# Patient Record
Sex: MALE | Race: WHITE
[De-identification: names, ages, dates, MRNs, and addresses within clinical notes are randomized per-mention and may not be internally consistent; named-entity substitution may affect disease eponyms.]

---

## 2019-04-26 ENCOUNTER — HOSPITAL ENCOUNTER (EMERGENCY)
Dept: HOSPITAL 97 - ER | Age: 59
Discharge: HOME | End: 2019-04-26
Payer: COMMERCIAL

## 2019-04-26 DIAGNOSIS — L03.314: Primary | ICD-10-CM

## 2019-04-26 LAB
BUN BLD-MCNC: 15 MG/DL (ref 7–18)
GLUCOSE SERPLBLD-MCNC: 136 MG/DL (ref 74–106)
HCT VFR BLD CALC: 40.6 % (ref 39.6–49)
INR BLD: 0.98
LYMPHOCYTES # SPEC AUTO: 2.5 K/UL (ref 0.7–4.9)
PMV BLD: 7.8 FL (ref 7.6–11.3)
POTASSIUM SERPL-SCNC: 3.9 MMOL/L (ref 3.5–5.1)
RBC # BLD: 4.55 M/UL (ref 4.33–5.43)

## 2019-04-26 PROCEDURE — 76870 US EXAM SCROTUM: CPT

## 2019-04-26 PROCEDURE — 96360 HYDRATION IV INFUSION INIT: CPT

## 2019-04-26 PROCEDURE — 99284 EMERGENCY DEPT VISIT MOD MDM: CPT

## 2019-04-26 PROCEDURE — 36415 COLL VENOUS BLD VENIPUNCTURE: CPT

## 2019-04-26 PROCEDURE — 85025 COMPLETE CBC W/AUTO DIFF WBC: CPT

## 2019-04-26 PROCEDURE — 80048 BASIC METABOLIC PNL TOTAL CA: CPT

## 2019-04-26 PROCEDURE — 85610 PROTHROMBIN TIME: CPT

## 2019-04-26 NOTE — RAD REPORT
EXAM DESCRIPTION:  US - Scrotum Testicles - 4/26/2019 8:36 pm

 

CLINICAL HISTORY:  Testicular pain

 

COMPARISON:  None

 

FINDINGS:  Right testicle measures 5 x 2.5 x 3 centimeters. Echotexture is homogeneous. Normal blood 
flow

 

Left testicle measures 5 x 2.6 x 3.4 centimeters. Echotexture is homogeneous. Normal blood flow

 

The epididymides are normal in size and echotexture. Normal blood flow is seen.

6 millimeter left spermatocele

 

Patient has a palpable area within the left inguinal region. Ultrasound demonstrates no gross abnorma
lity

 

IMPRESSION:  A 6 millimeter left spermatocele

## 2019-04-26 NOTE — ER
Nurse's Notes                                                                                     

 [facility]                                                                 

Name: Sandeep Valdez                                                                               

Age: 58 yrs                                                                                       

Sex: Male                                                                                         

: 1960                                                                                   

MRN: [id number]                                                                                   

Arrival Date: 2019                                                                          

Time: 18:17                                                                                       

Account#: [id number]                                                                            

Bed 15                                                                                            

Private MD: BRYAN Tena C                                                                            

Diagnosis: Cellulitis of groin-left inguinal                                                      

                                                                                                  

Presentation:                                                                                     

                                                                                             

18:36 Presenting complaint: Patient states: abscess to left groin area since Tuesday, was     iw  

      seen at [facility] urgent care, put on Clindamycin and hasn't gotten better. Transition of      

      care: patient was not received from another setting of care. Onset of symptoms was          

      2019. Risk Assessment: Do you want to hurt yourself or someone else? Patient      

      reports no desire to harm self or others. Initial Sepsis Screen: Does the patient meet      

      any 2 criteria? No. Patient's initial sepsis screen is negative. Does the patient have      

      a suspected source of infection? No. Patient's initial sepsis screen is negative. Care      

      prior to arrival: None.                                                                     

18:36 Method Of Arrival: Ambulatory                                                           iw  

18:36 Acuity: WILFRIDO 3                                                                           iw  

                                                                                                  

Historical:                                                                                       

- Allergies:                                                                                      

18:38 No Known Allergies;                                                                     iw  

- PMHx:                                                                                           

18:38 Diabetes - IDDM;                                                                        iw  

- PSHx:                                                                                           

18:38 left hand sx;                                                                           iw  

                                                                                                  

- Immunization history:: Adult Immunizations up to date.                                          

- Social history:: Smoking status: Patient/guardian denies using tobacco.                         

- Ebola Screening: : Patient negative for fever greater than or equal to 101.5 degrees            

  Fahrenheit, and additional compatible Ebola Virus Disease symptoms Patient denies               

  exposure to infectious person Patient denies travel to an Ebola-affected area in the            

  21 days before illness onset No symptoms or risks identified at this time.                      

                                                                                                  

                                                                                                  

Screenin:18 Abuse screen: Denies threats or abuse. Denies injuries from another. Nutritional        aj  

      screening: No deficits noted. Tuberculosis screening: No symptoms or risk factors           

      identified. Fall Risk None identified.                                                      

                                                                                                  

Assessment:                                                                                       

19:16 General: Appears in no apparent distress. comfortable, Behavior is calm, cooperative,   aj  

      appropriate for age. Pain: Complains of pain in groin and left inner thigh. Neuro:          

      Level of Consciousness is awake, alert, obeys commands. Respiratory: Airway is patent       

      Respiratory effort is even, unlabored, Respiratory pattern is regular, symmetrical.         

      Derm: Skin is intact, is healthy with good turgor, Skin is pink, warm \T\ dry. normal,      

      Abscess located on groin, left femoral area and left inner thigh is dime sized, is red.     

21:33 Reassessment: Patient appears in no apparent distress at this time. No changes from     aj  

      previously documented assessment. Patient and/or family updated on plan of care and         

      expected duration. Pain level reassessed. Patient is alert, oriented x 3, equal             

      unlabored respirations, skin warm/dry/pink. Patient states feeling better.                  

                                                                                                  

Vital Signs:                                                                                      

18:38  / 72; Pulse 84; Resp 16; Temp 99.0(O); Pulse Ox 96% on R/A; Weight 61.69 kg;     iw  

      Height 6 ft. 2 in. (187.96 cm); Pain 2/10;                                                  

18:38 Body Mass Index 17.46 (61.69 kg, 187.96 cm)                                             iw  

                                                                                                  

ED Course:                                                                                        

18:17 Patient arrived in ED.                                                                  mr  

18:17 BRYAN Tena MD is Private Physician.                                                       mr  

18:38 Triage completed.                                                                       iw  

18:38 Arm band placed on.                                                                     iw  

18:57 Daisha Valderrama, RN is Primary Nurse.                                                     aj  

19:17 Mamadou Putnam PA is PHCP.                                                                cp  

19:17 Silvia Olvera MD is Attending Physician.                                           cp  

19:52 Mamadou De La Vega MD is Attending Physician.                                             cp  

20:22 Patient has correct armband on for positive identification. Bed in low position.        aj  

20:22 Inserted saline lock: 20 gauge in right antecubital area, using aseptic technique.      aj  

      Blood collected.                                                                            

20:33 US Scrotum Testicles In Process Unspecified.                                            EDMS

21:13 Jus Ramirez MD is Referral Physician.                                              cp  

21:33 No provider procedures requiring assistance completed. IV discontinued, intact,         aj  

      bleeding controlled, No redness/swelling at site. Pressure dressing applied.                

                                                                                                  

Administered Medications:                                                                         

20:23 Drug: NS 0.9% 1000 ml Route: IV; Rate: 1 bolus; Site: right antecubital;                aj  

20:56 Follow up: Response: No adverse reaction; IV Status: Completed infusion; IV Intake:     aj  

      1000ml                                                                                      

20:56 Drug: LevaQUIN 750 mg Route: PO;                                                        aj  

21:34 Follow up: Response: No adverse reaction                                                aj  

                                                                                                  

                                                                                                  

Intake:                                                                                           

20:56 IV: 1000ml; Total: 1000ml.                                                              aj  

                                                                                                  

Outcome:                                                                                          

21:13 Discharge ordered by MD.                                                                cp  

21:33 Discharged to home ambulatory.                                                          aj  

21:33 Condition: good                                                                             

21:33 Discharge instructions given to patient, Instructed on discharge instructions, follow       

      up and referral plans. no driving heavy equipment, Demonstrated understanding of            

      instructions, follow-up care, medications, Prescriptions given X 2.                         

21:34 Patient left the ED.                                                                    aj  

                                                                                                  

Signatures:                                                                                       

Dispatcher [employer]                           EDDaisha Godoy RN RN aj Rivera, Mary mr Williams, Irene, RN RN iw Page, Corey, PA                         PA   cp                                                   

                                                                                                  

**************************************************************************************************

## 2019-04-26 NOTE — EDPHYS
Physician Documentation                                                                           

 [facility]                                                                 

Name: Sandeep Valdez                                                                               

Age: 58 yrs                                                                                       

Sex: Male                                                                                         

: 1960                                                                                   

MRN: [id number]                                                                                   

Arrival Date: 2019                                                                          

Time: 18:17                                                                                       

Account#: [id number]                                                                            

Bed 15                                                                                            

Private MD: BRYAN Tena C                                                                            

ED Physician Mamadou De La Vega                                                                      

HPI:                                                                                              

                                                                                             

19:59 This 58 yrs old  Male presents to ER via Ambulatory with complaints of Abscess.cp  

19:59 the patient presents with a swollen area of the left testicle and left groin.           cp  

20:00 Onset: The symptoms/episode began/occurred 3 day(s) ago.                                cp  

20:00 Associated signs and symptoms: Pertinent negatives: discharge, drainage, fever.         cp  

      Modifying factors: the symptoms are aggravated by movement, pressure. The patient has       

      been recently seen at an urgent care, this week, for similar complaints, prescribed         

      oral clindamycin.                                                                           

                                                                                                  

Historical:                                                                                       

- Allergies:                                                                                      

18:38 No Known Allergies;                                                                     iw  

- PMHx:                                                                                           

18:38 Diabetes - IDDM;                                                                        iw  

- PSHx:                                                                                           

18:38 left hand sx;                                                                           iw  

                                                                                                  

- Immunization history:: Adult Immunizations up to date.                                          

- Social history:: Smoking status: Patient/guardian denies using tobacco.                         

- Ebola Screening: : Patient negative for fever greater than or equal to 101.5 degrees            

  Fahrenheit, and additional compatible Ebola Virus Disease symptoms Patient denies               

  exposure to infectious person Patient denies travel to an Ebola-affected area in the            

  21 days before illness onset No symptoms or risks identified at this time.                      

                                                                                                  

                                                                                                  

ROS:                                                                                              

20:05 Constitutional: Negative for body aches, chills, fever, poor PO intake.                 cp  

20:05 Eyes: Negative for injury, pain, redness, and discharge.                                cp  

20:05 Cardiovascular: Negative for chest pain, palpitations.                                      

20:05 Respiratory: Negative for cough, shortness of breath, wheezing.                             

20:05 Abdomen/GI: Negative for abdominal pain, nausea, vomiting, and diarrhea.                    

20:05 Skin: Positive for erythema, swelling, of the left groin and left inguinal area.            

20:05 All other systems are negative.                                                             

                                                                                                  

Exam:                                                                                             

20:15 Head/Face:  Normocephalic, atraumatic.                                                  cp  

20:15 Constitutional: The patient appears in no acute distress, alert, awake, non-toxic, well     

      developed, well nourished.                                                                  

20:15 Eyes: Periorbital structures: appear normal, Conjunctiva: normal, no exudate, no        cp  

      injection, Sclera: no appreciated abnormality, Lids and lashes: appear normal,              

      bilaterally.                                                                                

20:15 ENT: External ear(s): are unremarkable, Nose: is normal, Mouth: Lips: moist, Oral           

      mucosa: moist, Posterior pharynx: Airway: no evidence of obstruction, patent.               

20:15 Chest/axilla: Inspection: normal.                                                           

20:15 Cardiovascular: Rate: normal.                                                               

20:15 Respiratory: the patient does not display signs of respiratory distress,  Respirations:     

      normal, no use of accessory muscles, no retractions, no splinting, no tachypnea.            

20:15 Abdomen/GI: Inspection: abdomen appears normal, Palpation: abdomen is soft and              

      non-tender, in all quadrants, rebound tenderness, is not appreciated, voluntary             

      guarding, is not appreciated, involuntary guarding, is not appreciated.                     

20:15 Skin: cellulitis, well demarcated, on the  left inguinal and left groin and left inner      

      thigh.                                                                                      

                                                                                                  

Vital Signs:                                                                                      

18:38  / 72; Pulse 84; Resp 16; Temp 99.0(O); Pulse Ox 96% on R/A; Weight 61.69 kg;     iw  

      Height 6 ft. 2 in. (187.96 cm); Pain 2/10;                                                  

18:38 Body Mass Index 17.46 (61.69 kg, 187.96 cm)                                             iw  

                                                                                                  

MDM:                                                                                              

19:17 Patient medically screened.                                                             cp  

20:00 Differential diagnosis: abscess, cellulitis, insect bite.                               cp  

21:10 Data reviewed: vital signs, nurses notes, lab test result(s), radiologic studies,       cp  

      ultrasound.                                                                                 

21:10 Counseling: I had a detailed discussion with the patient and/or guardian regarding: the cp  

      historical points, exam findings, and any diagnostic results supporting the                 

      discharge/admit diagnosis, lab results, radiology results, the need for outpatient          

      follow up, a urologist, to return to the emergency department if symptoms worsen or         

      persist or if there are any questions or concerns that arise at home. Response to           

      treatment: the patient's symptoms have mildly improved after treatment, and as a            

      result, I will discharge patient. ED course: VSS. Patient is to continue oral               

      clindamycin and will add oral Levaquin. Will discharge to home for continued monitoring.    

                                                                                                  

                                                                                             

19:53 Order name: CBC with Diff; Complete Time: 20:34                                         cp  

                                                                                             

20:34 Interpretation: Reviewed.                                                               cp  

                                                                                             

19:53 Order name: BMP; Complete Time: 20:34                                                   cp  

                                                                                             

20:51 Interpretation: Normal except: ; GLUC 136; CA 8.3.                                cp  

                                                                                             

19:53 Order name: US Scrotum Testicles; Complete Time: 20:46                                  cp  

                                                                                             

19:53 Order name: PT-INR; Complete Time: 20:34                                                cp  

                                                                                             

19:53 Order name: IV; Complete Time: 20:23                                                    cp  

                                                                                                  

Administered Medications:                                                                         

20:23 Drug: NS 0.9% 1000 ml Route: IV; Rate: 1 bolus; Site: right antecubital;                aj  

20:56 Follow up: Response: No adverse reaction; IV Status: Completed infusion; IV Intake:     aj  

      1000ml                                                                                      

20:56 Drug: LevaQUIN 750 mg Route: PO;                                                        aj  

21:34 Follow up: Response: No adverse reaction                                                  

                                                                                                  

                                                                                                  

Disposition:                                                                                      

19 21:13 Discharged to Home. Impression: Cellulitis of groin - left inguinal.               

- Condition is Stable.                                                                            

- Discharge Instructions: Cellulitis, Adult.                                                      

- Prescriptions for Levaquin 750 mg Oral Tablet - take 1 tablet by ORAL route once                

  daily for 8-10 days; 9 tablet. Ibuprofen 800 mg Oral Tablet - take 1 tablet by ORAL             

  route every 8 hours As needed take with food; 30 tablet.                                        

- Medication Reconciliation Form, Thank You Letter, Antibiotic Education, Prescription            

  Opioid Use form.                                                                                

- Follow up: Jus Ramirez MD; When: 2 - 3 days; Reason: Recheck today's complaints.           

- Problem is new.                                                                                 

- Symptoms have improved.                                                                         

                                                                                                  

                                                                                                  

                                                                                                  

Addendum:                                                                                         

2019                                                                                        

     10:57 Co-signature as Attending Physician, Mamadou De La Vega MD I agree with the assessment and  c
ha

           plan of care.                                                                          

                                                                                                  

Signatures:                                                                                       

Dispatcher [employer]                           EDDaisha Godoy RN RN aj Anderson, Corey, MD MD cha Williams, Irene, RN RN iw Page, Corey, PA PA   cp                                                   

                                                                                                  

Corrections: (The following items were deleted from the chart)                                    

                                                                                             

20:51 20:34 Normal except: ; GLUC 136. cp                                               cp  

20:58  20:00 This 58 yrs old  Male presents to ER via Ambulatory with           cp  

      complaints of Abscess. cp                                                                   

                                                                                             

20:58  20:00 the patient presents with a swollen area of the left lateral testicle and   cp  

      groin, cp                                                                                   

                                                                                             

20:58  20:00 Description: erythematous, swollen, warm, cp                                cp  

                                                                                             

21:24 21:13 2019 21:13 Discharged to Home. Impression: Cellulitis of groin - left.      cp  

      Condition is Stable. Forms are Medication Reconciliation Form, Thank You Letter,            

      Antibiotic Education, Prescription Opioid Use. Follow up: Jus Ramirez; When: 2 - 3       

      days; Reason: Recheck today's complaints. Problem is new. Symptoms have improved. cp        

21:34 21:24 2019 21:13 Discharged to Home. Impression: Cellulitis of groin - left       aj  

      inguinal. Condition is Stable. Discharge Instructions: Cellulitis, Adult. Prescriptions     

      for Levaquin 750 mg Oral Tablet - take 1 tablet by ORAL route once daily for 8-10 days;     

      9 tablet, Ibuprofen 800 mg Oral Tablet - take 1 tablet by ORAL route every 8 hours As       

      needed take with food; 30 tablet. and Forms are Medication Reconciliation Form, Thank       

      You Letter, Antibiotic Education, Prescription Opioid Use. Follow up: Jus Ramirez;       

      When: 2 - 3 days; Reason: Recheck today's complaints. Problem is new. Symptoms have         

      improved. cp                                                                                

                                                                                                  

**************************************************************************************************

## 2019-08-08 ENCOUNTER — HOSPITAL ENCOUNTER (EMERGENCY)
Dept: HOSPITAL 97 - ER | Age: 59
Discharge: HOME | End: 2019-08-08
Payer: COMMERCIAL

## 2019-08-08 DIAGNOSIS — S67.192A: Primary | ICD-10-CM

## 2019-08-08 DIAGNOSIS — E10.9: ICD-10-CM
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
## 2019-08-08 DIAGNOSIS — Z79.4: ICD-10-CM
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
## 2019-08-08 PROCEDURE — 99284 EMERGENCY DEPT VISIT MOD MDM: CPT

## 2019-08-08 NOTE — ER
Nurse's Notes                                                                                     

 [facility]                                                                 

Name: Sandeep Valdez                                                                               

Age: 58 yrs                                                                                       

Sex: Male                                                                                         

: 1960                                                                                   

MRN: [id number]                                                                                   

Arrival Date: 2019                                                                          

Time: 13:44                                                                                       

Account#: [id number]                                                                            

Bed 10                                                                                            

Private MD: BRYAN Tena C                                                                            

Diagnosis: Crush injury to tip of right index finger                                              

                                                                                                  

Presentation:                                                                                     

                                                                                             

13:47 Presenting complaint: Patient states: Was installing AC unit and dropped onto R middle  ph  

      finger, injury noted to nail. Transition of care: patient was not received from another     

      setting of care. Onset of symptoms was 2019. Risk Assessment: Do you want to     

      hurt yourself or someone else? Patient reports no desire to harm self or others.            

      Initial Sepsis Screen: Does the patient meet any 2 criteria? No. Patient's initial          

      sepsis screen is negative. Does the patient have a suspected source of infection? No.       

      Patient's initial sepsis screen is negative. Care prior to arrival: None.                   

13:47 Method Of Arrival: Ambulatory                                                           ph  

13:47 Acuity: WILFRIDO 4                                                                           ph  

                                                                                                  

Historical:                                                                                       

- Allergies:                                                                                      

13:49 No Known Allergies;                                                                     ph  

- Home Meds:                                                                                      

13:49 Altace 5 mg Oral cap [Active]; Humalog 100 unit/mL Sub-Q crtg for Type 1 Diabetes       ph  

      Mellitus [Active]; Tresiba FlexTouch U-100 100 unit/mL (3 mL) subcutaneous inpn             

      [Active]; pravastatin 40 mg Oral tab 1 tab once daily [Active];                             

- PMHx:                                                                                           

13:49 Diabetes - IDDM;                                                                        ph  

- PSHx:                                                                                           

13:49 left hand sx;                                                                           ph  

                                                                                                  

- Immunization history:: Adult Immunizations unknown.                                             

- Social history:: Smoking status: Patient/guardian denies using tobacco.                         

- Ebola Screening: : No symptoms or risks identified at this time.                                

                                                                                                  

                                                                                                  

Screenin:00 Abuse screen: Denies threats or abuse. Denies injuries from another. Nutritional        ph  

      screening: No deficits noted. Tuberculosis screening: No symptoms or risk factors           

      identified. Fall Risk None identified.                                                      

                                                                                                  

Assessment:                                                                                       

13:59 General: Appears in no apparent distress. comfortable, well groomed, Behavior is calm,  ph  

      cooperative, appropriate for age. Pain: Complains of pain in right middle fingernail.       

      Neuro: Level of Consciousness is awake, alert, obeys commands, Oriented to person,          

      place, time, situation. Cardiovascular: Capillary refill < 3 seconds Patient's skin is      

      warm and dry. Respiratory: Airway is patent Respiratory effort is even, unlabored.          

      Derm: Skin is pink, warm \T\ dry. Musculoskeletal: Circulation, motion, and sensation       

      intact. Range of motion: intact in all extremities, Swelling present in dorsal aspect       

      of distal phalanx of right middle finger. Injury Description: Crush injury sustained to     

      dorsal aspect of distal phalanx of right middle finger and right middle fingernail.         

15:55 Reassessment: Patient appears in no apparent distress at this time. Patient and/or      ph  

      family updated on plan of care and expected duration. Pain level reassessed. Patient is     

      alert, oriented x 3, equal unlabored respirations, skin warm/dry/pink.                      

                                                                                                  

Vital Signs:                                                                                      

13:49  / 78; Pulse 91; Resp 18; Temp 98.2; Pulse Ox 97% on R/A; Weight 101.6 kg; Height ph  

      6 ft. 2 in. (187.96 cm);                                                                    

13:49 Body Mass Index 28.76 (101.60 kg, 187.96 cm)                                            ph  

                                                                                                  

ED Course:                                                                                        

13:44 Patient arrived in ED.                                                                  mr  

13:44 BRYAN Tena MD is Private Physician.                                                       mr  

13:47 David Mccurdy MD is Attending Physician.                                              kdr 

13:48 Triage completed.                                                                       ph  

13:49 Arm band placed on right wrist. Patient placed in an exam room.                         ph  

13:51 Norma Bhakta, RN is Primary Nurse.                                                    ph  

14:00 Patient has correct armband on for positive identification. Bed in low position. Call   ph  

      light in reach. Door closed. Noise minimized.                                               

14:55 Hand Right 3 View XRAY In Process Unspecified.                                          EDMS

16:00 Aluminum finger splint applied to dorsal aspect of distal phalanx of right middle       ph  

      finger. Wound care: was soaked in normal saline solution.                                   

16:05 BRYAN Tena MD is Referral Physician.                                                      kdr 

16:20 No provider procedures requiring assistance completed. Patient did not have IV access   ph  

      during this emergency room visit.                                                           

                                                                                                  

Administered Medications:                                                                         

15:55 Drug: KeFLEX 500 mg Route: PO;                                                          ph  

16:25 Follow up: Response: No adverse reaction                                                ph  

15:55 Drug: Motrin 800 mg Route: PO;                                                          ph  

16:25 Follow up: Response: No adverse reaction                                                ph  

                                                                                                  

                                                                                                  

Outcome:                                                                                          

16:06 Discharge ordered by MD.                                                                kdr 

16:22 Patient left the ED.                                                                    hb  

16:22 Discharged to home ambulatory.                                                          ph  

16:22 Condition: good                                                                             

16:22 Discharge instructions given to patient, Instructed on discharge instructions, follow       

      up and referral plans. medication usage, wound care, Demonstrated understanding of          

      instructions, follow-up care, medications, wound care, Prescriptions given X 2.             

                                                                                                  

Signatures:                                                                                       

Dispatcher [employer]                           EDMS                                                 

David Mccurdy MD MD   [facility] [address]                                                  

Ramirez, Sera gongora                                                   

Norma Bhakta RN RN                                                      

Valerie Wong RN                     RN                                                      

                                                                                                  

Corrections: (The following items were deleted from the chart)                                    

16:24 14:00 Wound care: was soaked in normal saline solution, ph                              ph  

16:24 14:00 Aluminum finger splint applied to dorsal aspect of distal phalanx of right middle ph  

      finger ph                                                                                   

                                                                                                  

**************************************************************************************************

## 2019-08-08 NOTE — RAD REPORT
EXAM DESCRIPTION:  RAD - Hand Right 3 View - 8/8/2019 2:55 pm

 

CLINICAL HISTORY:  Blunt force trauma to the distal right middle finger, hand pain

 

COMPARISON:  None.

 

FINDINGS:  No fracture is identified. There is no dislocation or periosteal reaction noted.  No forei
gn body or other soft tissue abnormality.

 

IMPRESSION:  Negative right hand examination.

## 2019-08-08 NOTE — EDPHYS
Physician Documentation                                                                           

 [facility]                                                                 

Name: Sandeep Valdez                                                                               

Age: 58 yrs                                                                                       

Sex: Male                                                                                         

: 1960                                                                                   

MRN: [id number]                                                                                   

Arrival Date: 2019                                                                          

Time: 13:44                                                                                       

Account#: [id number]                                                                            

Bed 10                                                                                            

Private MD: BRYAN Tena C                                                                            

ED Physician David Mccurdy                                                                       

HPI:                                                                                              

                                                                                             

15:46 This 58 yrs old  Male presents to ER via Ambulatory with complaints of Finger  kdr 

      Injury.                                                                                     

15:46 Mechanism of injury:.                                                                   kdr 

15:47 The patient or guardian reports a contusion, injury, pain, swelling. The complaints     kdr 

      affect the right middle fingernail. Context: The problem was sustained at work,             

      resulted from a crush injury, by an appliance. Onset: The symptoms/episode                  

      began/occurred suddenly, at 10:30. Modifying factors: The symptoms are alleviated by        

      nothing, the symptoms are aggravated by nothing. Associated signs and symptoms: The         

      patient has no apparent associated signs or symptoms. Severity of symptoms: At their        

      worst the symptoms were mild, in the emergency department the symptoms are unchanged.       

      The patient has not experienced similar symptoms in the past. The patient has not           

      recently seen a physician.                                                                  

                                                                                                  

Historical:                                                                                       

- Allergies:                                                                                      

13:49 No Known Allergies;                                                                     ph  

- Home Meds:                                                                                      

13:49 Altace 5 mg Oral cap [Active]; Humalog 100 unit/mL Sub-Q crtg for Type 1 Diabetes       ph  

      Mellitus [Active]; Tresiba FlexTouch U-100 100 unit/mL (3 mL) subcutaneous inpn             

      [Active]; pravastatin 40 mg Oral tab 1 tab once daily [Active];                             

- PMHx:                                                                                           

13:49 Diabetes - IDDM;                                                                        ph  

- PSHx:                                                                                           

13:49 left hand sx;                                                                           ph  

                                                                                                  

- Immunization history:: Adult Immunizations unknown.                                             

- Social history:: Smoking status: Patient/guardian denies using tobacco.                         

- Ebola Screening: : No symptoms or risks identified at this time.                                

                                                                                                  

                                                                                                  

ROS:                                                                                              

15:47 Constitutional: Negative for fever, chills, and weight loss, Eyes: Negative for injury, kdr 

      pain, redness, and discharge.                                                               

15:47 MS/extremity: Positive for pain, tenderness, warmth, of the right middle finger and         

      right middle fingernail, Negative for acute changes, decreased range of motion,             

      paresthesias.                                                                               

                                                                                                  

Exam:                                                                                             

15:47 Musculoskeletal/extremity: The is a laceration perpendicular to the axis of the finger  kdr 

      on the distal portion of the nail of the right middle finger.  The proximal nail bed is     

      undisturbed and there is minimal blood accumulation under the distal portion of the         

      nail.  The circulation and perfusion are good.  .                                           

15:47 Constitutional:  This is a well developed, well nourished patient who is awake, alert,  kdr 

      and in no acute distress.                                                                   

                                                                                                  

Vital Signs:                                                                                      

13:49  / 78; Pulse 91; Resp 18; Temp 98.2; Pulse Ox 97% on R/A; Weight 101.6 kg; Height ph  

      6 ft. 2 in. (187.96 cm);                                                                    

13:49 Body Mass Index 28.76 (101.60 kg, 187.96 cm)                                            ph  

                                                                                                  

MDM:                                                                                              

15:47 Data reviewed: vital signs, nurses notes, lab test result(s), radiologic studies.       kdr 

      Counseling: I had a detailed discussion with the patient and/or guardian regarding: the     

      historical points, exam findings, and any diagnostic results supporting the                 

      discharge/admit diagnosis, lab results, radiology results, the need for outpatient          

      follow up, for definitive care.                                                             

16:06 Patient medically screened.                                                             kdr 

                                                                                                  

                                                                                             

14:17 Order name: Hand Right 3 View XRAY; Complete Time: 15:37                                kdr 

                                                                                             

15:46 Order name: Misc. Order: soak, clean and splint finger; Complete Time: 16:22            kdr 

                                                                                                  

Administered Medications:                                                                         

15:55 Drug: KeFLEX 500 mg Route: PO;                                                          ph  

16:25 Follow up: Response: No adverse reaction                                                ph  

15:55 Drug: Motrin 800 mg Route: PO;                                                          ph  

16:25 Follow up: Response: No adverse reaction                                                ph  

                                                                                                  

                                                                                                  

Disposition:                                                                                      

19 16:06 Discharged to Home. Impression: Crush injury to tip of right index finger.         

- Condition is Stable.                                                                            

- Discharge Instructions: Crush Injury of the Hand, Easy-to-Read.                                 

- Prescriptions for Keflex 500 mg Oral Capsule - take 1 capsule by ORAL route every 6             

  hours for 7 days; 28 capsule. Tramadol 50 mg Oral Tablet - take 1 tablet by ORAL                

  route every 8 hours as needed; 12 tablet.                                                       

- Medication Reconciliation Form, Thank You Letter, Antibiotic Education form.                    

- Follow up: BRYAN Tena MD; When: 2 - 3 days; Reason: If symptoms return, Further                   

  diagnostic work-up, Recheck today's complaints, Continuance of care, Re-evaluation by           

  your physician.                                                                                 

- Problem is new.                                                                                 

- Symptoms have improved.                                                                         

                                                                                                  

                                                                                                  

                                                                                                  

Signatures:                                                                                       

Dispatcher [employer]                           EDMS                                                 

David Mccurdy MD MD   kdr                                                  

Norma Bhakta RN                      RN                                                      

Valerie Wong RN                     RN                                                      

                                                                                                  

Corrections: (The following items were deleted from the chart)                                    

16:22 16:06 2019 16:06 Discharged to Home. Impression: Crush injury to tip of right     hb  

      index finger. Condition is Stable. Forms are Medication Reconciliation Form, Thank You      

      Letter, Antibiotic Education, Prescription Opioid Use. Follow up: A Tena; When: 2 - 3       

      days; Reason: If symptoms return, Further diagnostic work-up, Recheck today's               

      complaints, Continuance of care, Re-evaluation by your physician. Problem is new.           

      Symptoms have improved. kdr                                                                 

                                                                                                  

**************************************************************************************************

## 2019-10-16 ENCOUNTER — HOSPITAL ENCOUNTER (EMERGENCY)
Dept: HOSPITAL 97 - ER | Age: 59
Discharge: ANOTHER HOSPITAL | End: 2019-10-16
Payer: COMMERCIAL

## 2019-10-16 VITALS — OXYGEN SATURATION: 99 % | DIASTOLIC BLOOD PRESSURE: 89 MMHG | SYSTOLIC BLOOD PRESSURE: 175 MMHG

## 2019-10-16 VITALS — TEMPERATURE: 98.4 F
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
## 2019-10-16 DIAGNOSIS — M79.644: Primary | ICD-10-CM

## 2019-10-16 DIAGNOSIS — Z79.4: ICD-10-CM

## 2019-10-16 DIAGNOSIS — T63.091A: ICD-10-CM

## 2019-10-16 DIAGNOSIS — E78.5: ICD-10-CM

## 2019-10-16 DIAGNOSIS — E11.9: ICD-10-CM

## 2019-10-16 LAB
ALBUMIN SERPL BCP-MCNC: 4 G/DL (ref 3.4–5)
ALP SERPL-CCNC: 105 U/L (ref 45–117)
ALT SERPL W P-5'-P-CCNC: 33 U/L (ref 12–78)
AST SERPL W P-5'-P-CCNC: 24 U/L (ref 15–37)
BUN BLD-MCNC: 12 MG/DL (ref 7–18)
GLUCOSE SERPLBLD-MCNC: 249 MG/DL (ref 74–106)
HCT VFR BLD CALC: 45.1 % (ref 39.6–49)
INR BLD: 0.91
LYMPHOCYTES # SPEC AUTO: 2.9 K/UL (ref 0.7–4.9)
PMV BLD: 8.3 FL (ref 7.6–11.3)
POTASSIUM SERPL-SCNC: 4.3 MMOL/L (ref 3.5–5.1)
RBC # BLD: 5.14 M/UL (ref 4.33–5.43)

## 2019-10-16 PROCEDURE — 80076 HEPATIC FUNCTION PANEL: CPT

## 2019-10-16 PROCEDURE — 85610 PROTHROMBIN TIME: CPT

## 2019-10-16 PROCEDURE — 99285 EMERGENCY DEPT VISIT HI MDM: CPT

## 2019-10-16 PROCEDURE — 36415 COLL VENOUS BLD VENIPUNCTURE: CPT

## 2019-10-16 PROCEDURE — 80048 BASIC METABOLIC PNL TOTAL CA: CPT

## 2019-10-16 PROCEDURE — 85025 COMPLETE CBC W/AUTO DIFF WBC: CPT

## 2019-10-16 NOTE — ER
Nurse's Notes                                                                                     

 [facility]                                                                 

Name: Sandeep Valdez                                                                               

Age: 58 yrs                                                                                       

Sex: Male                                                                                         

: 1960                                                                                   

MRN: [id number]                                                                                   

Arrival Date: 10/16/2019                                                                          

Time: 07:54                                                                                       

Account#: [id number]                                                                            

Bed 17                                                                                            

Private MD:                                                                                       

Diagnosis: Toxic effect of unspecified snake venom                                                

                                                                                                  

Presentation:                                                                                     

10/16                                                                                             

07:54 Presenting complaint: Patient states: Snake bite x 15 min ago, "Pretty sure it was a    jl7 

      copper head.". Transition of care: patient was not received from another setting of         

      care. Onset of symptoms. Risk Assessment: Do you want to hurt yourself or someone else?     

      Patient reports no desire to harm self or others. Initial Sepsis Screen: Does the           

      patient meet any 2 criteria? No. Patient's initial sepsis screen is negative. Does the      

      patient have a suspected source of infection? No. Patient's initial sepsis screen is        

      negative. Care prior to arrival: None.                                                      

07:54 Method Of Arrival: Ambulatory                                                           [facility] 

07:54 Acuity: WILFRIDO 2                                                                           jl7 

                                                                                                  

Triage Assessment:                                                                                

07:57 Bite description: bite sustained to dorsal aspect of middle phalanx of right ring       jl7 

      finger and dorsal aspect of proximal phalanx of right ring finger by a snake, animal        

      information: vaccination(s) is unknown.                                                     

08:00 General: Behavior is anxious.                                                             

                                                                                                  

Historical:                                                                                       

- Allergies:                                                                                      

07:57 No Known Allergies;                                                                     jl7 

- Home Meds:                                                                                      

07:57 Humalog 100 unit/mL Sub-Q crtg for Type 1 Diabetes Mellitus [Active]; pravastatin 40 mg jl7 

      Oral tab 1 tab once daily [Active]; Tresiba FlexTouch U-100 100 unit/mL (3 mL)              

      subcutaneous inpn [Active]; Altace 5 mg Oral cap [Active];                                  

- PMHx:                                                                                           

07:57 Diabetes - IDDM; Hyperlipidemia;                                                        jl7 

                                                                                                  

- Immunization history:: Last tetanus immunization: up to date 2018.                              

- Social history:: Smoking status: Patient/guardian denies using tobacco.                         

- Ebola Screening: : No symptoms or risks identified at this time.                                

                                                                                                  

                                                                                                  

Screenin:20 Abuse screen: Denies threats or abuse. Denies injuries from another. Nutritional        jl7 

      screening: No deficits noted. Tuberculosis screening: No symptoms or risk factors           

      identified. Fall Risk IV access (20 points). Total [place] Fall Scale indicates No Risk       

      (0-24 pts).                                                                                 

                                                                                                  

Assessment:                                                                                       

08:00 General: Appears in no apparent distress. uncomfortable. Pain: Complains of pain in     ch  

      right hand. Neuro: No deficits noted. Respiratory: No deficits noted. Derm: Skin is         

      healthy with good turgor, Skin is diaphoretic, Skin is pale, Skin temperature is cool.      

09:31 Reassessment: Patient appears in no apparent distress at this time. Patient and/or      ch  

      family updated on plan of care and expected duration. Pain level reassessed. Patient is     

      alert, oriented x 3, equal unlabored respirations, skin warm/dry/pink. Patient states       

      feeling better. Patient states symptoms have improved.                                      

10:13 Reassessment: Patient appears in no apparent distress at this time. pt c/o increase in  ch  

      pain. physician notified. awaiting transport. Musculoskeletal: pt started with bright       

      red and slightly purple fourth finger R hand. no swelling to R hand. around 0815, pt        

      finger begins to get darker, purple nettie, and hand starts swelling. decision made to         

      give crofab. pt maintains CRT immediate in all digits of hand. at 0830, swelling is         

      still localized to hand. pt tolerating crofab well. at 0900, pt c/o pain, swelling is       

      ot hand and slightly up wrist. pt still has immediate crt. pt holding now with swelling     

      to R hand, bruising and purple tightness to fourth finger. hand is becoming slight          

      purple.                                                                                     

11:25 Reassessment: pt still having swelling. color in palm is slightly pale and bruised. pt  ch  

      has swelling almost up to elbow. pt arm swelling labeled with date and time. pt             

      transported by  EMS, report given at bedside. crofab transferred to EMS pump.             

                                                                                                  

Vital Signs:                                                                                      

07:57  / 78; Pulse 80; Resp 16; Temp 98.2; Pulse Ox 100% ; Pain 8/10;                   jl7 

08:00  / 69; Pulse 92; Resp 16; Temp 98.4; Pulse Ox 99% on R/A; Pain 6/10;              ch  

08:20  / 69; Pulse 82; Resp 16 S; Pulse Ox 100% on R/A;                                 jl7 

09:31  / 85; Pulse 107; Resp 22;                                                        ch  

09:51  / 80; Pulse 106; Resp 21; Pulse Ox 98% on 2 lpm NC;                              ch  

11:00  / 89; Pulse 111; Resp 20; Pulse Ox 99% on R/A; Pain 8/10;                        ch  

                                                                                                  

ED Course:                                                                                        

07:54 Patient arrived in ED.                                                                  jl7 

07:55 David Mccurdy MD is Attending Physician.                                              kdr 

07:55 Triage completed.                                                                       jl7 

07:57 Ruthie Abdullahi, RN is Primary Nurse.                                                ch  

07:57 Arm band placed on right wrist.                                                         jl7 

08:20 Patient has correct armband on for positive identification. Bed in low position. Call   jl7 

      light in reach. Side rails up X 1. Cardiac monitor on. Pulse ox on. NIBP on.                

08:20 Initial lab(s) drawn, by ED staff, sent to lab. Inserted saline lock: 18 gauge in left  jl7 

      forearm, using aseptic technique. ,using aseptic technique. inserted by KARI Hendricks       

      Blood collected.                                                                            

09:00 No provider procedures requiring assistance completed. Inserted saline lock: 18 gauge   ch  

      in left forearm, using aseptic technique.                                                   

11:00 Patient transferred, IV remains in place.                                               ch  

                                                                                                  

Administered Medications:                                                                         

08:05 Drug: morphine 4 mg Route: IVP; Site: left forearm;                                     ch  

09:00 Follow up: Response: No adverse reaction; No change in condition                        ch  

08:05 Drug: Zofran 4 mg Route: IVP; Site: left forearm;                                       ch  

11:23 Follow up: Response: No adverse reaction                                                ch  

08:28 Drug: fentaNYL (PF) 50 mcg Route: IVP; Site: left forearm;                              jl7 

11:23 Follow up: Response: No adverse reaction                                                ch  

08:40 Drug: CroFab 4 vials Route: IV; Rate: calculated rate; Site: left forearm;              ch  

09:40 Follow up: IV Status: Completed infusion; IV Intake: 250ml                              ch  

08:56 Not Given (Patient Refused): Tetanus-Diphtheria Toxoid Adult 0.5 ml IM once             ch  

09:00 Drug: fentaNYL (PF) 50 mcg Route: IVP; Site: left forearm;                              ch  

11:22 Follow up: Response: No adverse reaction; Pain is decreased                             ch  

09:00 Drug: Phenergan 25 mg Route: IVP; Site: left forearm;                                   ch  

11:21 Follow up: Response: No adverse reaction                                                ch  

09:45 Drug: CroFab 2 vials Route: IV; Rate: calculated rate; Site: left femoral;              jl7 

10:40 Follow up: IV Status: Completed infusion; IV Intake: 250ml                              ch  

09:53 Drug: NS 0.9% 500 ml Route: IV; Rate: bolus; Site: left forearm;                        ch  

11:21 Follow up: IV Status: Completed infusion; IV Intake: 500ml                              ch  

10:40 Drug: CroFab 2 vials Route: IV; Rate: calculated rate; Site: left forearm;              ch  

11:21 Follow up: IV Status: Infusion continued upon transfer                                  ch  

10:40 Drug: fentaNYL (PF) 50 mcg Route: IVP; Site: left forearm;                              ch  

11:20 Follow up: Response: No adverse reaction; Marked relief of symptoms                     ch  

11:15 Drug: fentaNYL (PF) 50 mcg Route: IVP; Site: left forearm;                              ch  

11:20 Follow up: Response: No adverse reaction                                                ch  

                                                                                                  

                                                                                                  

Intake:                                                                                           

09:40 IV: 250ml; Total: 250ml.                                                                ch  

10:40 IV: 250ml; Total: 500ml.                                                                ch  

11:21 IV: 500ml; Total: 1000ml.                                                               ch  

                                                                                                  

Outcome:                                                                                          

08:31 ER care complete, transfer ordered by MD.                                               kdr 

11:00 Transferred to [facility], Transfer form completed.                           ch  

11:00 Condition: stable                                                                           

11:00 Instructed on the need for transfer.                                                        

11:26 Patient left the ED.                                                                    ch  

                                                                                                  

Signatures:                                                                                       

Ruthie Abdullahi, RN                  RN                                                      

David Mccurdy MD MD kdr Leal, Jahala, RN                        RN   jl7                                                  

                                                                                                  

**************************************************************************************************

## 2019-10-16 NOTE — EDPHYS
Physician Documentation                                                                           

 [facility]                                                                 

Name: Sandeep Valdez                                                                               

Age: 58 yrs                                                                                       

Sex: Male                                                                                         

: 1960                                                                                   

MRN: [id number]                                                                                   

Arrival Date: 10/16/2019                                                                          

Time: 07:54                                                                                       

Account#: [id number]                                                                            

Bed 17                                                                                            

Private MD:                                                                                       

ED Physician David Mccurdy                                                                       

HPI:                                                                                              

10/16                                                                                             

07:58 This 58 yrs old  Male presents to ER via Ambulatory with complaints of Snake   kdr 

      bite.                                                                                       

07:58 The patient was bitten on the dorsal aspect of middle phalanx of right ring finger and  kdr 

      dorsal aspect of proximal phalanx of right ring finger, by a snake, while approaching       

      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 He then         

      noted what appeared to be a large copperhead snake. The bite occurred about 15 minutes      

      PTA. The patient now has some bleeding to the finger and pain in the finger and hand.       

      Minimal swelling and redness..                                                              

                                                                                                  

Historical:                                                                                       

- Allergies:                                                                                      

07:57 No Known Allergies;                                                                     jl7 

- Home Meds:                                                                                      

07:57 Humalog 100 unit/mL Sub-Q crtg for Type 1 Diabetes Mellitus [Active]; pravastatin 40 mg jl7 

      Oral tab 1 tab once daily [Active]; Tresiba FlexTouch U-100 100 unit/mL (3 mL)              

      subcutaneous inpn [Active]; Altace 5 mg Oral cap [Active];                                  

- PMHx:                                                                                           

07:57 Diabetes - IDDM; Hyperlipidemia;                                                        jl7 

                                                                                                  

- Immunization history:: Last tetanus immunization: up to date 2018.                              

- Social history:: Smoking status: Patient/guardian denies using tobacco.                         

- Ebola Screening: : No symptoms or risks identified at this time.                                

                                                                                                  

                                                                                                  

ROS:                                                                                              

08:02 Constitutional: Negative for fever, chills, and weight loss, Eyes: Negative for injury, kdr 

      pain, redness, and discharge, Neck: Negative for injury, pain, and swelling,                

      Cardiovascular: Negative for chest pain, palpitations, and edema, Respiratory: Negative     

      for shortness of breath, cough, wheezing, and pleuritic chest pain, Abdomen/GI:             

      Negative for abdominal pain, nausea, vomiting, diarrhea, and constipation, Back:            

      Negative for injury and pain, : Negative for injury, bleeding, discharge, and             

      swelling, MS/Extremity: Negative for injury and deformity, Skin: Negative for injury,       

      rash, and discoloration, Neuro: Negative for headache, weakness, numbness, tingling,        

      and seizure activity. Psych: Negative for depression, anxiety, suicide ideation,            

      homicidal ideation, and hallucinations, Allergy/Immunology: Negative for hives, rash,       

      and allergies, Endocrine: Negative for neck swelling, polydipsia, polyuria, polyphagia,     

      and marked weight changes, Hematologic/Lymphatic: Negative for swollen nodes, abnormal      

      bleeding, and unusual bruising.                                                             

                                                                                                  

Exam:                                                                                             

08:02 Constitutional:  This is a well developed, well nourished patient who is awake, alert,  kdr 

      and in no acute distress. Head/Face:  Normocephalic, atraumatic. Eyes:  Pupils equal        

      round and reactive to light, extra-ocular motions intact.  Lids and lashes normal.          

      Conjunctiva and sclera are non-icteric and not injected.  Cornea within normal limits.      

      Periorbital areas with no swelling, redness, or edema. Neck:  Trachea midline, no           

      thyromegaly or masses palpated, and no cervical lymphadenopathy.  Supple, full range of     

      motion without nuchal rigidity, or vertebral point tenderness.  No Meningismus.             

      Chest/axilla:  Normal chest wall appearance and motion.  Nontender with no deformity.       

      No lesions are appreciated. Cardiovascular:  Regular rate and rhythm with a normal S1       

      and S2.  No gallops, murmurs, or rubs.  Normal PMI, no JVD.  No pulse deficits.             

      Respiratory:  Lungs have equal breath sounds bilaterally, clear to auscultation and         

      percussion.  No rales, rhonchi or wheezes noted.  No increased work of breathing, no        

      retractions or nasal flaring. Abdomen/GI:  Soft, non-tender, with normal bowel sounds.      

      No distension or tympany.  No guarding or rebound.  No evidence of tenderness               

      throughout. Back:  No spinal tenderness.  No costovertebral tenderness.  Full range of      

      motion. Neuro:  Awake and alert, GCS 15, oriented to person, place, time, and               

      situation.  Cranial nerves II-XII grossly intact.  Motor strength 5/5 in all                

      extremities.  Sensory grossly intact.  Cerebellar exam normal.  Normal gait. Psych:         

      Awake, alert, with orientation to person, place and time.  Behavior, mood, and affect       

      are within normal limits.                                                                   

08:02 Musculoskeletal/extremity: Extremities: grossly normal except: noted in the dorsal          

      aspect of middle phalanx of right ring finger and dorsal aspect of proximal phalanx of      

      right ring finger: bite, decreased ROM, erythema, pain, tenderness.                         

                                                                                                  

Vital Signs:                                                                                      

07:57  / 78; Pulse 80; Resp 16; Temp 98.2; Pulse Ox 100% ; Pain 8/10;                   jl7 

08:00  / 69; Pulse 92; Resp 16; Temp 98.4; Pulse Ox 99% on R/A; Pain 6/10;              ch  

08:20  / 69; Pulse 82; Resp 16 S; Pulse Ox 100% on R/A;                                 jl7 

09:31  / 85; Pulse 107; Resp 22;                                                        ch  

09:51  / 80; Pulse 106; Resp 21; Pulse Ox 98% on 2 lpm NC;                              ch  

11:00  / 89; Pulse 111; Resp 20; Pulse Ox 99% on R/A; Pain 8/10;                        ch  

                                                                                                  

MDM:                                                                                              

08:31 Patient medically screened.                                                             kdr 

08:31 Data reviewed: vital signs, nurses notes. Counseling: I had a detailed discussion with  kdr 

      the patient and/or guardian regarding: the historical points, exam findings, and any        

      diagnostic results supporting the discharge/admit diagnosis, lab results, radiology         

      results, the need to transfer to another facility. ED course: On initial presentation,      

      the patient had pain but minimal swelling. Over the course of the next 20-30 minutes,       

      the pain continued despite pain medication and the swelling was worsening and spreading     

      beyond his finger and into his hand. Given the worsening pain, hand swelling and            

      circumferential swelling of the ring finger, will administer CroFab.                        

11:19 ED course: The patient had worsening pain and swelling of the hand with slowing         kdr 

      progression..                                                                               

                                                                                                  

10/16                                                                                             

07:57 Order name: CBC with Diff                                                               [facility] 

10/16                                                                                             

07:57 Order name: Basic Metabolic Panel                                                       [facility] 

10/16                                                                                             

07:57 Order name: Creatinine for Radiology                                                    [facility] 

10/16                                                                                             

07:57 Order name: Hepatic Function                                                            [facility] 

10/16                                                                                             

07:57 Order name: PT-INR                                                                      [facility] 

10/16                                                                                             

07:57 Order name: IV Saline Lock; Complete Time: 08:28                                        [facility] 

10/16                                                                                             

07:57 Order name: Labs collected and sent; Complete Time: 08:28                               kdr 

                                                                                                  

Administered Medications:                                                                         

08:05 Drug: morphine 4 mg Route: IVP; Site: left forearm;                                       

09:00 Follow up: Response: No adverse reaction; No change in condition                          

08:05 Drug: Zofran 4 mg Route: IVP; Site: left forearm;                                       ch  

11:23 Follow up: Response: No adverse reaction                                                ch  

08:28 Drug: fentaNYL (PF) 50 mcg Route: IVP; Site: left forearm;                              jl7 

11:23 Follow up: Response: No adverse reaction                                                ch  

08:40 Drug: CroFab 4 vials Route: IV; Rate: calculated rate; Site: left forearm;              ch  

09:40 Follow up: IV Status: Completed infusion; IV Intake: 250ml                              ch  

08:56 Not Given (Patient Refused): Tetanus-Diphtheria Toxoid Adult 0.5 ml IM once             ch  

09:00 Drug: fentaNYL (PF) 50 mcg Route: IVP; Site: left forearm;                              ch  

11:22 Follow up: Response: No adverse reaction; Pain is decreased                             ch  

09:00 Drug: Phenergan 25 mg Route: IVP; Site: left forearm;                                   ch  

11:21 Follow up: Response: No adverse reaction                                                ch  

09:45 Drug: CroFab 2 vials Route: IV; Rate: calculated rate; Site: left femoral;              jl7 

10:40 Follow up: IV Status: Completed infusion; IV Intake: 250ml                              ch  

09:53 Drug: NS 0.9% 500 ml Route: IV; Rate: bolus; Site: left forearm;                        ch  

11:21 Follow up: IV Status: Completed infusion; IV Intake: 500ml                              ch  

10:40 Drug: CroFab 2 vials Route: IV; Rate: calculated rate; Site: left forearm;              ch  

11:21 Follow up: IV Status: Infusion continued upon transfer                                  ch  

10:40 Drug: fentaNYL (PF) 50 mcg Route: IVP; Site: left forearm;                              ch  

11:20 Follow up: Response: No adverse reaction; Marked relief of symptoms                     ch  

11:15 Drug: fentaNYL (PF) 50 mcg Route: IVP; Site: left forearm;                              ch  

11:20 Follow up: Response: No adverse reaction                                                ch  

                                                                                                  

                                                                                                  

Disposition:                                                                                      

10/16/19 08:31 Transfer ordered to [facility]. Diagnosis is Toxic      

  effect of unspecified snake venom.                                                              

- Reason for transfer: Higher level of care.                                                      

- Accepting physician is Trauma/hand.                                                             

- Condition is Serious.                                                                           

- Problem is new.                                                                                 

- Symptoms are unchanged.                                                                         

                                                                                                  

                                                                                                  

                                                                                                  

Signatures:                                                                                       

Dispatcher [employer]                           Ruthie Lopez RN RN                                                      

David Mccurdy MD MD kdr Leal, Jahala, RN                        RN   jl7                                                  

                                                                                                  

Corrections: (The following items were deleted from the chart)                                    

11:26 08:31 10/16/2019 08:31 Transfer ordered to [facility].       ch  

      Diagnosis is Toxic effect of unspecified snake venom. Reason for transfer: Higher level     

      of care. Accepting physician is Trauma/hand. Condition is Serious. Problem is new.          

      Symptoms are unchanged. kdr                                                                 

                                                                                                  

**************************************************************************************************

## 2024-09-08 ENCOUNTER — HOSPITAL ENCOUNTER (EMERGENCY)
Dept: HOSPITAL 97 - ER | Age: 64
Discharge: HOME | End: 2024-09-08
Payer: COMMERCIAL

## 2024-09-08 VITALS — DIASTOLIC BLOOD PRESSURE: 76 MMHG | SYSTOLIC BLOOD PRESSURE: 144 MMHG | TEMPERATURE: 98 F

## 2024-09-08 VITALS — OXYGEN SATURATION: 96 %

## 2024-09-08 DIAGNOSIS — E78.5: ICD-10-CM

## 2024-09-08 DIAGNOSIS — E11.9: ICD-10-CM

## 2024-09-08 DIAGNOSIS — N39.0: Primary | ICD-10-CM

## 2024-09-08 DIAGNOSIS — T83.038A: ICD-10-CM

## 2024-09-08 LAB
SQUAMOUS URNS QL MICRO: <5 /HPF
UA COMPLETE W REFLEX CULTURE PNL UR: (no result)
UA COMPLETE W REFLEX CULTURE PNL UR: (no result)

## 2024-09-08 PROCEDURE — 81001 URINALYSIS AUTO W/SCOPE: CPT

## 2024-09-08 PROCEDURE — 87088 URINE BACTERIA CULTURE: CPT

## 2024-09-08 PROCEDURE — 87086 URINE CULTURE/COLONY COUNT: CPT

## 2024-09-08 PROCEDURE — 99283 EMERGENCY DEPT VISIT LOW MDM: CPT

## 2024-09-08 NOTE — EDPHYS
Physician Documentation                                                                           

 [facility]                                                                 

Name: Sandeep Valdez                                                                               

Age: 63 yrs                                                                                       

Sex: Male                                                                                         

: 1960                                                                                   

MRN: [id number]                                                                                   

Arrival Date: 2024                                                                          

Time: 20:36                                                                                       

Account#: [id number]                                                                            

Bed 15                                                                                            

Private MD:                                                                                       

ED Physician Isaiah Posada                                                                        

HPI:                                                                                              

                                                                                             

20:49 This 63 yrs old  Male presents to ER via Ambulatory with complaints of Problem ec2 

      With Urinary Catheter.                                                                      

20:49 Patient arrives today for evaluation of urinary discomfort. Patient reports that he has ec2 

      had a Aguirre catheter in place for approximately 1.5 months. Has a history of                

      prostatomegaly. Patient reports he has some urinary discomfort, is leaking around the       

      catheter as well. No abdominal pain, no nausea or vomiting..                                

                                                                                                  

Historical:                                                                                       

- PMHx:                                                                                           

20:44 Diabetes - IDDM; Hyperlipidemia; prostate issue (knee);                                 tm6 

- PSHx:                                                                                           

20:44 knee (ip);                                                                              tm6 

                                                                                                  

- Immunization history:: Client reports having NOT received the Covid vaccine.                    

- Infectious Disease History:: Denies.                                                            

- Social history:: Smoking status: Patient denies any tobacco usage or history of.                

  Patient/guardian denies using alcohol.                                                          

                                                                                                  

                                                                                                  

ROS:                                                                                              

20:49 Constitutional: as per hpi                                                              ec2 

                                                                                                  

Exam:                                                                                             

20:49 Constitutional:  GEN: NAD                                                                   

Head: atraumatic                                                                                  

Eyes: EOMI                                                                                        

Ears: External ears are          ec2                                                              

      normal.                                                                                     

CV: regular rate                                                                                  

LUNGS: no respiratory distress                                                                    

ABD: non-distended                                                                                

SKIN: no                                                                                          

      evidence of rashes                                                                          

MSK: no evidence of trauma                                                                        

                                                                                                  

Vital Signs:                                                                                      

20:41  / 79; Pulse 97; Resp 18; Temp 98.8(O); Pulse Ox 98% on R/A; Weight 102.51 kg;    tm6 

      Height 6 ft. 2 in. ; Pain 0/10;                                                             

21:44  / 73; Pulse 88; Resp 17; Pulse Ox 96% on R/A; Pain 0/10;                         rg5 

22:16  / 76; Pulse 92; Resp 17; Temp 98; Pain 0/10;                                     rg5 

20:41 Body Mass Index 29.02 (102.51 kg, 187.96 cm)                                            tm6 

20:41 Pain Scale: Adult                                                                       tm6 

21:44 Pain Scale: Adult                                                                       rg5 

22:16 Pain Scale: Adult                                                                       rg5 

                                                                                                  

MDM:                                                                                              

20:48 Patient medically screened.                                                             ec2 

20:49 Data reviewed: vital signs. ED course: Patient arrives today for evaluation of urinary  ec2 

      issues. Examination remarkable for well-appearing nontoxic dividual is hemodynamically      

      stable. Will replace Aguirre catheter, will send urine sample..                               

22:16 ED course: Urine with leuk esterase and WBCs present, will start the patient on         ec2 

      antibiotics have the patient follow-up outpatient with urology. .                           

                                                                                                  

                                                                                             

20:49 Order name: UAM; Complete Time: 22:16                                                   ec2 

                                                                                             

22:10 Order name: Urine Culture                                                               EDMS

                                                                                             

20:49 Order name: Aguirre; Complete Time: 21:14                                                 ec2 

                                                                                             

20:51 Order name: Misc. Order: replace aguirre catheter, send UA from new catheter; Complete    ec2 

      Time: 21:14                                                                                 

                                                                                                  

Administered Medications:                                                                         

22:45 Drug: Trimethoprim-Sulfamethoxazole PO (160 mg-800 mg (DS) 1 tablet PO once Route: PO;  rg5 

23:01 Follow up: Response: No adverse reaction                                                rg5 

                                                                                                  

                                                                                                  

Disposition Summary:                                                                              

24 22:17                                                                                    

Discharge Ordered                                                                                 

 Notes:       Location: Home                                                                        
  ec2

      Condition: Stable                                                                       ec2 

      Diagnosis                                                                                   

        - UTI/ Urinary tract infection, site not specified                                    ec2 

        - Dysuria                                                                             ec2 

      Followup:                                                                               ec2 

        - With: Chencho Hart MD                                                                

        - When:                                                                                    

        - Reason: Recheck today's complaints                                                       

      Discharge Instructions:                                                                     

        - Discharge Summary Sheet                                                             ec2 

        - Urinary Tract Infection, Adult, Easy-to-Read                                        ec2 

      Forms:                                                                                      

        - Medication Reconciliation Form                                                      ec2 

        - Antibiotic Education                                                                ec2 

        - Prescription Opioid Use                                                             ec2 

        - Patient Portal Instructions                                                         ec2 

        - Leadership Thank You Letter                                                         ec2 

      Prescriptions:                                                                              

        - Bactrim -160 mg Oral Tablet                                                        

            - take 1 tablet ORAL route every 12 hours for 7 days; 14 tablet; Refills: 0,      ec2 

      Product Selection Permitted                                                                 

Signatures:                                                                                       

Dispatcher [employer]                           Isaiah Guerrier MD MD   ec2                                                  

Mansi Blood RN                   RN   tm6                                                  

Zana Crawley RN                    RN   rg5                                                  

                                                                                                  

**************************************************************************************************

## 2024-09-08 NOTE — ER
Nurse's Notes                                                                                     

 [facility]                                                                 

Name: Sandeep Valdez                                                                               

Age: 63 yrs                                                                                       

Sex: Male                                                                                         

: 1960                                                                                   

MRN: [id number]                                                                                   

Arrival Date: 2024                                                                          

Time: 20:36                                                                                       

Account#: [id number]                                                                            

Bed 15                                                                                            

Private MD:                                                                                       

Diagnosis: UTI/ Urinary tract infection, site not specified;Dysuria                               

                                                                                                  

Presentation:                                                                                     

                                                                                             

20:41 Chief complaint: Patient states: scheduled for surgery in November due to prostate      tm6 

      issue. All day today, I have had a feeling like I need to pee, and it hurts. Aguirre is       

      also leaking. Ebola Screen: Patient negative for fever greater than or equal to 101.5       

      degrees Fahrenheit, and additional compatible Ebola Virus Disease symptoms Patient          

      denies exposure to infectious person. Patient denies travel to an Ebola-affected area       

      in the 21 days before illness onset. No symptoms or risks identified at this time.          

      Initial Sepsis Screen: Does the patient meet any 2 criteria? No. Patient's initial          

      sepsis screen is negative. Does the patient have a suspected source of infection? No.       

      Patient's initial sepsis screen is negative. Risk Assessment: Do you want to hurt           

      yourself or someone else? Patient reports no desire to harm self or others. Onset of        

      symptoms was 2024.                                                            

20:41 Method Of Arrival: Ambulatory                                                           tm6 

20:41 Acuity: WILFRIDO 3                                                                           tm6 

20:41 Coronavirus screen: Vaccine status: Patient reports being unvaccinated.                 tm6 

                                                                                                  

Triage Assessment:                                                                                

20:41 General: Appears in no apparent distress. Behavior is calm, cooperative. Pain:          tm6 

      Complains of pain in pelvis Pain currently is 0 out of 10 on a pain scale. at worst was     

      7 out of 10 on a pain scale. Pain began 1 day ago. EENT: No signs and/or symptoms were      

      reported regarding the EENT system. Neuro: Level of Consciousness is awake, alert,          

      obeys commands, Oriented to person, place, time, situation. Cardiovascular: Patient's       

      skin is warm and dry. Respiratory: Airway is patent Respiratory effort is even,             

      unlabored, Respiratory pattern is regular, symmetrical. GI: No signs and/or symptoms        

      were reported involving the gastrointestinal system. Abdomen is flat, non-distended.        

      : Reports pain testicle, Pain is 0 out of 10 on a pain scale. has aguirre, feels like       

      he has to pee, has pain, aguirre is leaking. Derm: No signs and/or symptoms reported          

      regarding the dermatologic system. Musculoskeletal: No signs and/or symptoms reported       

      regarding the musculoskeletal system.                                                       

                                                                                                  

Historical:                                                                                       

- PMHx:                                                                                           

20:44 Diabetes - IDDM; Hyperlipidemia; prostate issue (knee);                                 tm6 

- PSHx:                                                                                           

20:44 knee (ip);                                                                              tm6 

                                                                                                  

- Immunization history:: Client reports having NOT received the Covid vaccine.                    

- Infectious Disease History:: Denies.                                                            

- Social history:: Smoking status: Patient denies any tobacco usage or history of.                

  Patient/guardian denies using alcohol.                                                          

                                                                                                  

                                                                                                  

Screenin:45 [facility] ED Fall Risk Assessment (Adult) History of falling in the last 3 months,       rg5 

      including since admission No falls in past 3 months (0 pts) Confusion or Disorientation     

      No (0 pts) Intoxicated or Sedated No (0 pts) Impaired Gait No (0 pts) Mobility Assist       

      Device Used No (0 pt) Altered Elimination No (0 pt) Score/Fall Risk Level 0 - 2 = Low       

      Risk Oriented to surroundings, Maintained a safe environment, Educated pt \T\ family on     

      fall prevention, incl call for assistance when getting out of bed, Provided non-skid        

      footwear. Abuse screen: Denies threats or abuse. Nutritional screening: No deficits         

      noted. Tuberculosis screening: No symptoms or risk factors identified.                      

                                                                                                  

Assessment:                                                                                       

20:45 General: Appears in no apparent distress. Behavior is calm, cooperative, appropriate    rg5 

      for age. Pain: Denies pain. Neuro: No deficits noted. Level of Consciousness is awake,      

      alert, obeys commands, Oriented to person, place, time, situation.                          

20:45 Cardiovascular: Heart tones S1 S2 Capillary refill < 3 seconds Patient's skin is warm   rg5 

      and dry. Respiratory: Airway is patent Trachea midline. GI: Abdomen is round                

      non-distended, Abd is soft and non tender X 4 quads. : Aguirre in place Reports             

      inability to void, since this morning. EENT: No deficits noted. Derm: Skin is intact,       

      Skin is dry, Skin is normal, Skin temperature is warm. Musculoskeletal: Range of            

      motion: intact in all extremities.                                                          

21:45 Reassessment: Patient and/or family updated on plan of care and expected duration. Pain rg5 

      level reassessed. Patient is alert, oriented x 3, equal unlabored respirations, skin        

      warm/dry/pink. Patient is alert/active/playful, equal unlabored respirations, skin          

      warm/dry/pink. Patient states feeling better. Patient states symptoms have improved.        

22:30 Reassessment: Patient and/or family updated on plan of care and expected duration. Pain rg5 

      level reassessed. Patient is alert, oriented x 3, equal unlabored respirations, skin        

      warm/dry/pink. Patient states feeling better. Patient states symptoms have improved.        

                                                                                                  

Vital Signs:                                                                                      

20:41  / 79; Pulse 97; Resp 18; Temp 98.8(O); Pulse Ox 98% on R/A; Weight 102.51 kg;    tm6 

      Height 6 ft. 2 in. ; Pain 0/10;                                                             

21:44  / 73; Pulse 88; Resp 17; Pulse Ox 96% on R/A; Pain 0/10;                         rg5 

22:16  / 76; Pulse 92; Resp 17; Temp 98; Pain 0/10;                                     rg5 

20:41 Body Mass Index 29.02 (102.51 kg, 187.96 cm)                                            tm6 

20:41 Pain Scale: Adult                                                                       tm6 

21:44 Pain Scale: Adult                                                                       rg5 

22:16 Pain Scale: Adult                                                                       rg5 

                                                                                                  

ED Course:                                                                                        

20:38 Patient arrived in ED.                                                                  ra3 

20:41 Isaiah Posada MD is Attending Physician.                                               ec2 

20:41 Arm band placed on right wrist.                                                         tm6 

20:42 Triage completed.                                                                       tm6 

20:45 Patient has correct armband on for positive identification. Bed in low position. Side   rg5 

      rails up X 1.                                                                               

20:45 No provider procedures requiring assistance completed. Patient did not have IV access   rg5 

      during this emergency room visit.                                                           

20:48 Zana Crawley, RN is Primary Nurse.                                                  rg5 

21:27 Awaiting lab results.                                                                   rg5 

22:17 Chencho Hart MD is Referral Physician.                                              ec2 

23:01 Provided Education on: post er care.                                                    rg5 

                                                                                                  

Administered Medications:                                                                         

22:45 Drug: Trimethoprim-Sulfamethoxazole PO (160 mg-800 mg (DS) 1 tablet PO once Route: PO;  rg5 

23:01 Follow up: Response: No adverse reaction                                                rg5 

                                                                                                  

                                                                                                  

Medication:                                                                                       

20:45 VIS not applicable for this client.                                                     rg5 

                                                                                                  

Outcome:                                                                                          

22:17 Discharge ordered by MD.                                                                ec2 

23:01 Discharged to home ambulatory,                                                          rg5 

23:01 Condition: stable                                                                           

23:01 Discharge instructions given to patient,                                                    

23:03 Patient left the ED.                                                                    rg5 

                                                                                                  

Signatures:                                                                                       

Isaiah Posada MD MD   ec2                                                  

Mansi Blood RN                   RN   tm6                                                  

Jenni Kim                                   3                                                  

Zana Crawley RN                    RN   rg5                                                  

                                                                                                  

Corrections: (The following items were deleted from the chart)                                    

20:45 20:41 Acuity: WILFRIDO 4 tm6                                                                 tm6 

                                                                                                  

**************************************************************************************************